# Patient Record
Sex: MALE | Race: WHITE | URBAN - METROPOLITAN AREA
[De-identification: names, ages, dates, MRNs, and addresses within clinical notes are randomized per-mention and may not be internally consistent; named-entity substitution may affect disease eponyms.]

---

## 2018-01-16 ENCOUNTER — OFFICE VISIT (OUTPATIENT)
Dept: OTOLARYNGOLOGY | Facility: CLINIC | Age: 58
End: 2018-01-16

## 2018-01-16 VITALS
WEIGHT: 170 LBS | HEIGHT: 68 IN | TEMPERATURE: 98 F | DIASTOLIC BLOOD PRESSURE: 86 MMHG | BODY MASS INDEX: 25.76 KG/M2 | SYSTOLIC BLOOD PRESSURE: 133 MMHG

## 2018-01-16 DIAGNOSIS — J01.90 ACUTE SINUSITIS, RECURRENCE NOT SPECIFIED, UNSPECIFIED LOCATION: Primary | ICD-10-CM

## 2018-01-16 PROCEDURE — 99212 OFFICE O/P EST SF 10 MIN: CPT | Performed by: OTOLARYNGOLOGY

## 2018-01-16 PROCEDURE — 99203 OFFICE O/P NEW LOW 30 MIN: CPT | Performed by: OTOLARYNGOLOGY

## 2018-01-16 RX ORDER — AMOXICILLIN AND CLAVULANATE POTASSIUM 875; 125 MG/1; MG/1
1 TABLET, FILM COATED ORAL EVERY 12 HOURS
Qty: 20 TABLET | Refills: 0 | Status: SHIPPED | OUTPATIENT
Start: 2018-01-16

## 2018-01-17 NOTE — PROGRESS NOTES
Dominick Alfaro is a 66year old male.  Patient presents with:  Voice Problem: hoarseness of voice since saturday  Throat Problem: sore throat, post nasal drip since saturday    HPI:   The last week she's been experiencing problems with facial pressure [facility] Normal Orientation - Oriented to time, place, person & situation. Appropriate mood and affect. Lymph Detail Normal Submental. Submandibular. Anterior cervical. Posterior cervical. Supraclavicular.    Eyes Normal Conjunctiva - Right: Normal,

## 2019-01-10 ENCOUNTER — OFFICE VISIT (OUTPATIENT)
Dept: OTOLARYNGOLOGY | Facility: CLINIC | Age: 59
End: 2019-01-10
Payer: COMMERCIAL

## 2019-01-10 VITALS — HEIGHT: 68 IN | BODY MASS INDEX: 25.01 KG/M2 | WEIGHT: 165 LBS

## 2019-01-10 DIAGNOSIS — J01.90 ACUTE SINUSITIS, RECURRENCE NOT SPECIFIED, UNSPECIFIED LOCATION: Primary | ICD-10-CM

## 2019-01-10 PROCEDURE — 99212 OFFICE O/P EST SF 10 MIN: CPT | Performed by: OTOLARYNGOLOGY

## 2019-01-10 PROCEDURE — 99214 OFFICE O/P EST MOD 30 MIN: CPT | Performed by: OTOLARYNGOLOGY

## 2019-01-10 RX ORDER — ACETAMINOPHEN, ASPIRIN AND CAFFEINE 250; 250; 65 MG/1; MG/1; MG/1
1 TABLET, FILM COATED ORAL EVERY 6 HOURS PRN
COMMUNITY

## 2019-01-10 RX ORDER — AMOXICILLIN AND CLAVULANATE POTASSIUM 875; 125 MG/1; MG/1
1 TABLET, FILM COATED ORAL EVERY 12 HOURS
Qty: 20 TABLET | Refills: 0 | Status: SHIPPED | OUTPATIENT
Start: 2019-01-10

## 2019-01-10 RX ORDER — MONTELUKAST SODIUM 10 MG/1
10 TABLET ORAL NIGHTLY
Qty: 30 TABLET | Refills: 3 | Status: SHIPPED | OUTPATIENT
Start: 2019-01-10

## 2019-01-10 RX ORDER — AZELASTINE 1 MG/ML
2 SPRAY, METERED NASAL 2 TIMES DAILY
Qty: 1 BOTTLE | Refills: 0 | Status: SHIPPED | OUTPATIENT
Start: 2019-01-10

## 2019-01-10 NOTE — PROGRESS NOTES
Montse Jean is a 64year old male. Patient presents with:  Throat Problem: pt reports throat pain   Nose Problem: pt reports nasal congested, mucus for 1 wk      HISTORY OF PRESENT ILLNESS  Previous history of acute sinusitis about a year ago.   Once a place, person & situation. Appropriate mood and affect.    Neck Exam Normal Inspection - Normal. Palpation - Normal. Parotid gland - Normal. Thyroid gland - Normal.   Eyes Normal Conjunctiva - Right: Normal, Left: Normal. Pupil - Right: Normal, Left: Normal 875-125 MG Oral Tab, Take 1 tablet by mouth every 12 (twelve) hours. , Disp: 20 tablet, Rfl: 0  ASSESSMENT AND PLAN    1. Acute sinusitis, recurrence not specified, unspecified location  I suspect an acute infection.   Start Augmentin for 10 days as well as

## 2023-06-29 ENCOUNTER — OFFICE VISIT (OUTPATIENT)
Dept: OTOLARYNGOLOGY | Facility: CLINIC | Age: 63
End: 2023-06-29

## 2023-06-29 DIAGNOSIS — H61.23 BILATERAL IMPACTED CERUMEN: ICD-10-CM

## 2023-06-29 DIAGNOSIS — H93.19 TINNITUS, UNSPECIFIED LATERALITY: Primary | ICD-10-CM

## 2023-06-29 RX ORDER — PSYLLIUM HUSK 0.4 G
30000 CAPSULE ORAL DAILY
COMMUNITY

## 2023-06-29 RX ORDER — METHYLPREDNISOLONE 4 MG/1
4 TABLET ORAL AS DIRECTED
COMMUNITY
Start: 2023-06-23

## 2023-06-29 RX ORDER — AZITHROMYCIN 250 MG/1
TABLET, FILM COATED ORAL
COMMUNITY
Start: 2023-06-23

## 2025-05-13 ENCOUNTER — NURSE ONLY (OUTPATIENT)
Dept: AUDIOLOGY | Facility: CLINIC | Age: 65
End: 2025-05-13

## 2025-05-13 ENCOUNTER — OFFICE VISIT (OUTPATIENT)
Dept: OTOLARYNGOLOGY | Facility: CLINIC | Age: 65
End: 2025-05-13

## 2025-05-13 DIAGNOSIS — H93.19 TINNITUS, UNSPECIFIED LATERALITY: Primary | ICD-10-CM

## 2025-05-13 PROCEDURE — 99213 OFFICE O/P EST LOW 20 MIN: CPT | Performed by: OTOLARYNGOLOGY

## 2025-05-13 NOTE — PROGRESS NOTES
Javan Angeles is a 65 year old male.    Chief Complaint   Patient presents with    Ear Problem     Pt states he has ringing in his ears    Sinus Problem     Has been on amox from the , states he has phlegm, and feels tired       HISTORY OF PRESENT ILLNESS  Previous history of acute sinusitis about a year ago.  Once again he was flying in a plane and within a matter of days after landing he started developing throat pain postnasal discharge as well as nasal congestion with associated nasal mucopurulence.  No other signs, symptoms or complaints.  Currently on no medications.     6/29/23 he presents today with tinnitus which usually occurs when he has wax impaction.  Had COVID about 2 months ago within the last week or 2 has been having some other type of viral infection with conjunctivitis as well as weakness and malaise.     5/13/25 about to go he developed some type of infectious process with phlegm in his throat and just absolute weakness and lack of energy.  Went to urgent care had negative COVID flu and strep testing performed at that time was diagnosed with some type of bacterial infection started on amoxicillin for 7 days which she has used having finished last week without any improvement in symptoms.  Over the weekend about 10 days out from the onset of this problem it took him almost 3 hours to get out of bed.  No energy just fatigued feels overall 50% better from when this all started.  Today was a better day overall.  No real signs or symptoms other than fatigue and generalized malaise.      Social Hx on file[1]    Family History[2]    Past Medical History[3]    Past Surgical History[4]      REVIEW OF SYSTEMS    System Neg/Pos Details   Constitutional Negative Fatigue, fever and weight loss.   ENMT Negative Drooling.   Eyes Negative Blurred vision and vision changes.   Respiratory Negative Dyspnea and wheezing.   Cardio Negative Chest pain, irregular heartbeat/palpitations and syncope.   GI Negative  Abdominal pain and diarrhea.   Endocrine Negative Cold intolerance and heat intolerance.   Neuro Negative Tremors.   Psych Negative Anxiety and depression.   Integumentary Negative Frequent skin infections, pigment change and rash.   Hema/Lymph Negative Easy bleeding and easy bruising.           PHYSICAL EXAM    There were no vitals taken for this visit.       Constitutional Normal Overall appearance - Normal.   Psychiatric Normal Orientation - Oriented to time, place, person & situation. Appropriate mood and affect.   Neck Exam Normal Inspection - Normal. Palpation - Normal. Parotid gland - Normal. Thyroid gland - Normal.   Eyes Normal Conjunctiva - Right: Normal, Left: Normal. Pupil - Right: Normal, Left: Normal. Fundus - Right: Normal, Left: Normal.   Neurological Normal Memory - Normal. Cranial nerves - Cranial nerves II through XII grossly intact.   Head/Face Normal Facial features - Normal. Eyebrows - Normal. Skull - Normal.        Nasopharynx Normal External nose - Normal. Lips/teeth/gums - Normal. Tonsils - Normal. Oropharynx - Normal.   Ears Normal Inspection - Right: Normal, Left: Normal. Canal - Right: Normal, Left: Normal. TM - Right: Normal, Left: Normal.   Skin Normal Inspection - Normal.        Lymph Detail Normal Submental. Submandibular. Anterior cervical. Posterior cervical. Supraclavicular.        Nose/Mouth/Throat Normal External nose - Normal. Lips/teeth/gums - Normal. Tonsils - Normal. Oropharynx - Normal.   Nose/Mouth/Throat Normal Nares - Right: Normal Left: Normal. Septum -Normal  Turbinates - Right: Normal, Left: Normal.     Medications - Current[5]  ASSESSMENT AND PLAN    1. Tinnitus, unspecified laterality  Long-term issues with tinnitus.  Ears were cleaned of cerumen out of the tympanic membranes bilaterally.  It does sound like he may be dealing with a recent viral infection if he just has chronic fatigue that comes and goes.  No real symptoms other than some phlegm in the back of his  throat on occasion.  I have asked him to simply whether this out and to treat any constitutional signs and to stay hydrated and to rest is much as possible.  Return to see me as needed otherwise I did recommend that he follow-up with his primary care physician if his symptoms of chronic fatigue continue.  - Audiology Referral - [place] ([facility])        This note was prepared using Dragon [employer] voice recognition dictation software. As a result errors may occur. When identified these errors have been corrected. While every attempt is made to correct errors during dictation discrepancies may still exist    Holger Croft MD    5/13/2025    1:56 PM         [1]   Social History  Socioeconomic History    Marital status: Single   Tobacco Use    Smoking status: Never    Smokeless tobacco: Never   Substance and Sexual Activity    Alcohol use: No    Drug use: No   [2] History reviewed. No pertinent family history.  [3]   Past Medical History:   Testicular cancer (HCC)   [4]   Past Surgical History:  Procedure Laterality Date    Other surgical history      turbinates reduction 6/2002   [5]   Current Outpatient Medications:     Amoxicillin-Pot Clavulanate 875-125 MG Oral Tab, Take 1 tablet by mouth every 12 (twelve) hours., Disp: 20 tablet, Rfl: 0    methylPREDNISolone 4 MG Oral Tablet Therapy Pack, Take 1 tablet (4 mg total) by mouth As Directed. FOLLOW DIRECTIONS ON PACKAGING (Patient not taking: Reported on 5/13/2025), Disp: , Rfl:     azithromycin 250 MG Oral Tab, TAKE 2 TABLET BY MOUTH ON DAY 1 THEN TAKE 1 TABLET BY MOUTH DAILY (Patient not taking: Reported on 5/13/2025), Disp: , Rfl:     Cholecalciferol (VITAMIN D-1000 MAX ST) 25 MCG (1000 UT) Oral Tab, Take 30 tablets (30,000 Units total) by mouth daily. (Patient not taking: Reported on 5/13/2025), Disp: , Rfl:     Montelukast Sodium 10 MG Oral Tab, Take 1 tablet (10 mg total) by mouth nightly. (Patient not taking: Reported on 5/13/2025), Disp: 30 tablet,  Rfl: 3    Loratadine-Pseudoephedrine ER 5-120 MG Oral Tablet 12 Hr, Take 1 tablet by mouth every 12 (twelve) hours. (Patient not taking: Reported on 5/13/2025), Disp: 60 tablet, Rfl: 3    Azelastine HCl 0.1 % Nasal Solution, 2 sprays by Nasal route 2 (two) times daily. (Patient not taking: Reported on 5/13/2025), Disp: 1 Bottle, Rfl: 0    DM-Guaifenesin ER  MG Oral Tablet 12 Hr, Take 1 tablet by mouth every 12 (twelve) hours. (Patient not taking: Reported on 5/13/2025), Disp: , Rfl:     Amoxicillin-Pot Clavulanate 875-125 MG Oral Tab, Take 1 tablet by mouth every 12 (twelve) hours. (Patient not taking: Reported on 5/13/2025), Disp: 20 tablet, Rfl: 0